# Patient Record
Sex: MALE | Race: WHITE | HISPANIC OR LATINO | Employment: STUDENT | URBAN - METROPOLITAN AREA
[De-identification: names, ages, dates, MRNs, and addresses within clinical notes are randomized per-mention and may not be internally consistent; named-entity substitution may affect disease eponyms.]

---
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
## 2025-04-03 ENCOUNTER — OFFICE VISIT (OUTPATIENT)
Dept: URGENT CARE | Facility: CLINIC | Age: 20
End: 2025-04-03
Payer: COMMERCIAL

## 2025-04-03 VITALS
RESPIRATION RATE: 18 BRPM | BODY MASS INDEX: 34.02 KG/M2 | OXYGEN SATURATION: 97 % | SYSTOLIC BLOOD PRESSURE: 116 MMHG | WEIGHT: 243 LBS | DIASTOLIC BLOOD PRESSURE: 74 MMHG | HEIGHT: 71 IN | HEART RATE: 92 BPM | TEMPERATURE: 98.6 F

## 2025-04-03 DIAGNOSIS — Z23 ENCOUNTER FOR IMMUNIZATION: ICD-10-CM

## 2025-04-03 DIAGNOSIS — S61.412A LACERATION OF LEFT HAND WITHOUT FOREIGN BODY, INITIAL ENCOUNTER: Primary | ICD-10-CM

## 2025-04-03 PROCEDURE — 90715 TDAP VACCINE 7 YRS/> IM: CPT

## 2025-04-03 PROCEDURE — 99213 OFFICE O/P EST LOW 20 MIN: CPT | Performed by: PHYSICIAN ASSISTANT

## 2025-04-03 PROCEDURE — 90471 IMMUNIZATION ADMIN: CPT | Performed by: PHYSICIAN ASSISTANT

## 2025-04-03 NOTE — PROGRESS NOTES
[facility]        NAME: Brandon Pritchard is a 20 y.o. male  : 2005    MRN: [id number]  DATE: April 3, 2025  TIME: 2:51 PM      Assessment and Plan     Laceration of left hand without foreign body, initial encounter [S61.412A]  1. Laceration of left hand without foreign body, initial encounter        2. Encounter for immunization  Tdap Vaccine greater than or equal to 6yo        Medical Decision Making Note:   Updated Tdap vaccine today   Patient did not wish to have sutures because he did not want to limit usage of his hand   Bandaged with bacitracin and bandaid     Patient Instructions     Patient Instructions   Clean the wound daily with soap and water   Pat dry and apply antibiotic ointment and bandage        Follow up with primary care provider.   Go to ER if symptoms worsen.    Chief Complaint     Chief Complaint   Patient presents with    Hand Laceration     Pt states he was cutting food with a knife and sliced left 2nd digit knuckle open about 15 minutes ago.          History of Present Illness     Patient presents with a laceration to his left hand second finger knuckle x 15-20 minutes ago. He states he was cooking food and cut himself with a knife. His last tetanus shot was in .           Review of Systems     Review of Systems   Constitutional:  Negative for chills, fatigue and fever.   HENT:  Negative for congestion, ear pain, postnasal drip, rhinorrhea, sinus pressure, sinus pain, sneezing and sore throat.    Eyes:  Negative for pain and visual disturbance.   Respiratory:  Negative for cough and shortness of breath.    Cardiovascular:  Negative for chest pain and palpitations.   Gastrointestinal:  Negative for abdominal pain, diarrhea, nausea and vomiting.   Genitourinary:  Negative for dysuria and hematuria.   Musculoskeletal:  Negative for arthralgias, back pain and myalgias.   Skin:  Positive for wound. Negative for rash.   Neurological:  Negative for dizziness, seizures, syncope,  "numbness and headaches.   All other systems reviewed and are negative.        Current Medications       Current Outpatient Medications:     promethazine-dextromethorphan (PHENERGAN-DM) 6.25-15 mg/5 mL oral syrup, Take 5 mL by mouth 4 (four) times a day as needed for cough (Patient not taking: Reported on 4/3/2025), Disp: 118 mL, Rfl: 0    Current Allergies     Allergies as of 04/03/2025    (No Known Allergies)              The following portions of the patient's history were reviewed and updated as appropriate: allergies, current medications, past family history, past medical history, past social history, past surgical history, and problem list.     Past Medical History:   Diagnosis Date    Eczema        History reviewed. No pertinent surgical history.    History reviewed. No pertinent family history.      Medications have been verified.        Objective     /74   Pulse 92   Temp 98.6 °F (37 °C)   Resp 18   Ht 5' 11\" (1.803 m)   Wt 110 kg (243 lb)   SpO2 97%   BMI 33.89 kg/m²   No LMP for male patient.         Physical Exam     Physical Exam  Vitals and nursing note reviewed.   Constitutional:       Appearance: Normal appearance. He is normal weight.   HENT:      Head: Normocephalic and atraumatic.   Cardiovascular:      Rate and Rhythm: Normal rate.   Pulmonary:      Effort: Pulmonary effort is normal.   Skin:     General: Skin is warm and dry.      Findings: Laceration present.      Comments: 1cm linear laceration to the posterior aspect of the 2nd MCP. Bleeding controlled. SQ tissue exposed. Slightly gaping    Neurological:      General: No focal deficit present.      Mental Status: He is alert and oriented to person, place, and time.   Psychiatric:         Mood and Affect: Mood normal.         Behavior: Behavior normal.       "

## 2025-05-16 ENCOUNTER — OFFICE VISIT (OUTPATIENT)
Dept: URGENT CARE | Facility: CLINIC | Age: 20
End: 2025-05-16
Payer: COMMERCIAL

## 2025-05-16 VITALS
TEMPERATURE: 98.3 F | RESPIRATION RATE: 18 BRPM | DIASTOLIC BLOOD PRESSURE: 76 MMHG | WEIGHT: 242.8 LBS | OXYGEN SATURATION: 97 % | BODY MASS INDEX: 33.86 KG/M2 | HEART RATE: 72 BPM | SYSTOLIC BLOOD PRESSURE: 102 MMHG

## 2025-05-16 DIAGNOSIS — S61.313A LACERATION OF LEFT MIDDLE FINGER WITHOUT FOREIGN BODY WITH DAMAGE TO NAIL, INITIAL ENCOUNTER: Primary | ICD-10-CM

## 2025-05-16 PROCEDURE — 99214 OFFICE O/P EST MOD 30 MIN: CPT | Performed by: NURSE PRACTITIONER

## 2025-05-16 PROCEDURE — 12001 RPR S/N/AX/GEN/TRNK 2.5CM/<: CPT | Performed by: NURSE PRACTITIONER

## 2025-05-16 NOTE — LETTER
May 16, 2025     Patient: Brandon Pritchard   YOB: 2005   Date of Visit: 5/16/2025       To Whom it May Concern:    Brandon Pritchard was seen in my clinic on 5/16/2025. He may return to work on 05/17/2025.    If you have any questions or concerns, please don't hesitate to call.         Sincerely,          ENOCH Sagastume        CC: No Recipients

## 2025-05-16 NOTE — PROGRESS NOTES
[facility]        NAME: Brandon Pritchard is a 20 y.o. male  : 2005    MRN: [id number]  DATE: May 16, 2025  TIME: 11:30 AM    Assessment and Plan   Laceration of left middle finger without foreign body with damage to nail, initial encounter [S61.313A]  1. Laceration of left middle finger without foreign body with damage to nail, initial encounter          Surgifoam applied to the tip of the left middle finger and pressure dressing applied. Discussed with patient the fat pad tip is unable to be reattached as the tissue would most likely die and cause more infection risk than benefit. Keep the area clean and dry. UTD with tetanus. Works as a , discussed safe cutting and being mindful as this is his second laceration in the month.     Patient Instructions       Follow up with PCP in 3-5 days.  Proceed to  ER if symptoms worsen.    If tests are performed, our office will contact you with results only if changes need to made to the care plan discussed with you at the visit. You can review your full results on [facility].    Chief Complaint     Chief Complaint   Patient presents with    Laceration     Patient here with left middle finger laceration. He cut it with a knife while making breakfast this morning. This happened around 10:20-10:30am          History of Present Illness       Patient cut off the tip of the left middle finger. He has the fat pad with him  hoping it could be reattached. Patient also cut his left index finger joint about a month ago but did not require sutures. He did receive tdap at that time and is up to date.  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     Laceration   The incident occurred less than 1 hour ago. The laceration is located on the Left hand. The laceration is 2 cm in size. The laceration mechanism was a clean knife. The pain is mild. The pain has been Constant since onset. He reports no foreign bodies present. His tetanus status is  UTD.       Review of Systems   Review of Systems   Constitutional:  Negative for chills, fatigue and fever.   Musculoskeletal:  Negative for arthralgias, joint swelling and myalgias.   Skin:  Positive for color change and wound. Negative for pallor and rash.   Neurological:  Negative for weakness and numbness.   Hematological:  Negative for adenopathy. Does not bruise/bleed easily.         Current Medications     Current Medications[1]    Current Allergies     Allergies as of 05/16/2025    (No Known Allergies)            The following portions of the patient's history were reviewed and updated as appropriate: allergies, current medications, past family history, past medical history, past social history, past surgical history and problem list.     Past Medical History:   Diagnosis Date    Eczema        History reviewed. No pertinent surgical history.    History reviewed. No pertinent family history.      Medications have been verified.        Objective   /76   Pulse 72   Temp 98.3 °F (36.8 °C)   Resp 18   Wt 110 kg (242 lb 12.8 oz)   SpO2 97%   BMI 33.86 kg/m²        Physical Exam     Physical Exam  Vitals and nursing note reviewed.   Constitutional:       General: He is not in acute distress.     Appearance: Normal appearance. He is not ill-appearing.   HENT:      Head: Normocephalic and atraumatic.     Cardiovascular:      Rate and Rhythm: Normal rate and regular rhythm.      Heart sounds: Normal heart sounds, S1 normal and S2 normal.   Pulmonary:      Effort: Pulmonary effort is normal. No accessory muscle usage.      Breath sounds: Normal breath sounds. No wheezing.     Skin:     General: Skin is warm and dry.      Capillary Refill: Capillary refill takes less than 2 seconds.      Findings: Laceration present.      Comments: Avulsion of fat pad from left middle finger tip.      Neurological:      General: No focal deficit present.      Mental Status: He is alert and oriented to person, place, and  "time.      Motor: Motor function is intact.     Psychiatric:         Attention and Perception: Attention and perception normal.         Mood and Affect: Mood and affect normal.         Speech: Speech normal.         Behavior: Behavior normal. Behavior is cooperative.         Thought Content: Thought content normal.             Universal Protocol:  procedure performed by consultantConsent: Verbal consent obtained  Risks and benefits: risks, benefits and alternatives were discussed  Consent given by: patient  Time out: Immediately prior to procedure a \"time out\" was called to verify the correct patient, procedure, equipment, support staff and site/side marked as required.  Timeout called at: 5/16/2025 11:25 AM.  Patient understanding: patient states understanding of the procedure being performed  Patient consent: the patient's understanding of the procedure matches consent given  Patient identity confirmed: verbally with patient and provided demographic data  Laceration repair    Date/Time: 5/16/2025 11:00 AM    Performed by: ENOCH Sagastume  Authorized by: ENOCH Sagastume  Body area: upper extremity  Location details: left long finger  Laceration length: 2 (avulsed fat pad) cm  Foreign bodies: no foreign bodies  Tendon involvement: none  Nerve involvement: none    Sedation:  Patient sedated: no        Procedure Details:  Irrigation solution: saline  Amount of cleaning: standard  Debridement: none  Degree of undermining: none  Wound skin closure material used: surgifoam.  Dressing: 4x4 sterile gauze, gauze roll and pressure dressing  Patient tolerance: patient tolerated the procedure well with no immediate complications                     [1]   Current Outpatient Medications:     promethazine-dextromethorphan (PHENERGAN-DM) 6.25-15 mg/5 mL oral syrup, Take 5 mL by mouth 4 (four) times a day as needed for cough (Patient not taking: Reported on 5/16/2025), Disp: 118 mL, Rfl: 0    "